# Patient Record
Sex: FEMALE | Race: WHITE | NOT HISPANIC OR LATINO | URBAN - METROPOLITAN AREA
[De-identification: names, ages, dates, MRNs, and addresses within clinical notes are randomized per-mention and may not be internally consistent; named-entity substitution may affect disease eponyms.]

---

## 2017-08-09 ENCOUNTER — APPOINTMENT (RX ONLY)
Dept: URBAN - METROPOLITAN AREA CLINIC 169 | Facility: CLINIC | Age: 52
Setting detail: DERMATOLOGY
End: 2017-08-09

## 2017-08-09 DIAGNOSIS — L98.8 OTHER SPECIFIED DISORDERS OF THE SKIN AND SUBCUTANEOUS TISSUE: ICD-10-CM

## 2017-08-09 PROBLEM — J44.9 CHRONIC OBSTRUCTIVE PULMONARY DISEASE, UNSPECIFIED: Status: ACTIVE | Noted: 2017-08-09

## 2017-08-09 PROBLEM — D23.61 OTHER BENIGN NEOPLASM OF SKIN OF RIGHT UPPER LIMB, INCLUDING SHOULDER: Status: ACTIVE | Noted: 2017-08-09

## 2017-08-09 PROBLEM — F32.9 MAJOR DEPRESSIVE DISORDER, SINGLE EPISODE, UNSPECIFIED: Status: ACTIVE | Noted: 2017-08-09

## 2017-08-09 PROBLEM — D48.5 NEOPLASM OF UNCERTAIN BEHAVIOR OF SKIN: Status: ACTIVE | Noted: 2017-08-09

## 2017-08-09 PROBLEM — E13.9 OTHER SPECIFIED DIABETES MELLITUS WITHOUT COMPLICATIONS: Status: ACTIVE | Noted: 2017-08-09

## 2017-08-09 PROBLEM — F41.9 ANXIETY DISORDER, UNSPECIFIED: Status: ACTIVE | Noted: 2017-08-09

## 2017-08-09 PROBLEM — I10 ESSENTIAL (PRIMARY) HYPERTENSION: Status: ACTIVE | Noted: 2017-08-09

## 2017-08-09 PROBLEM — C44.619 BASAL CELL CARCINOMA OF SKIN OF LEFT UPPER LIMB, INCLUDING SHOULDER: Status: ACTIVE | Noted: 2017-08-09

## 2017-08-09 PROCEDURE — ? COUNSELING

## 2017-08-09 PROCEDURE — ? BIOPSY BY SHAVE METHOD

## 2017-08-09 PROCEDURE — ? BIOPSY BY SHAVE METHOD AND DESTRUCTION

## 2017-08-09 PROCEDURE — 11100: CPT | Mod: 59

## 2017-08-09 PROCEDURE — 17261 DSTRJ MAL LES T/A/L .6-1.0CM: CPT

## 2017-08-09 PROCEDURE — 99202 OFFICE O/P NEW SF 15 MIN: CPT | Mod: 25

## 2017-08-09 ASSESSMENT — LOCATION SIMPLE DESCRIPTION DERM: LOCATION SIMPLE: LEFT UPPER ARM

## 2017-08-09 ASSESSMENT — LOCATION DETAILED DESCRIPTION DERM: LOCATION DETAILED: LEFT ANTERIOR PROXIMAL UPPER ARM

## 2017-08-09 ASSESSMENT — LOCATION ZONE DERM: LOCATION ZONE: ARM

## 2017-08-09 NOTE — PROCEDURE: BIOPSY BY SHAVE METHOD AND DESTRUCTION
Wound Care: Bacitracin
Consent: Verbal consent was obtained and risks were reviewed including but not limited to scarring, infection, bleeding, scabbing, incomplete removal, nerve damage and allergy to anesthesia.
Post-Care Instructions: Written post-care instruction given.   If the biopsy results does reveal that the lesion is a non-melanoma skin cancer, the lesion has been treated at the time of the biopsy with EDC.  This is usually adequate treatment for most NMSC but the site will need to be recheck in 3-6 months to ensure adequacy of treatment as there is a 7 to 10% chance of recurrence.  If the biopsy shows a more aggressive pathology then expected then additional treatment beyond EDC may be needed.
Biopsy Type: H and E
Number Of Curettages: 3
Billing Type: Third-Party Bill
Detail Level: Detailed
Bill For Surgical Tray: no
Render Post-Care Instructions In Note?: yes
Size Of Lesion In Cm (Optional): 0.7
Destruction Type: electrodesiccation
Lab: [id number]
Anesthesia Volume In Cc: 0.5
Hemostasis: Electrocautery
Anesthesia Type: 1% lidocaine without epinephrine
Bill As?: Malignant Destruction
Notification Instructions: Patient will be notified of biopsy results. However, patient instructed to call the office if not contacted within 2 weeks.

## 2017-08-09 NOTE — PROCEDURE: BIOPSY BY SHAVE METHOD
Additional Anesthesia Volume In Cc (Will Not Render If 0): 0
Type Of Destruction Used: Curettage
Size Of Lesion In Cm: 0.4
Anesthesia Type: 1% lidocaine with epinephrine
Silver Nitrate Text: The wound bed was treated with silver nitrate after the biopsy was performed.
Hemostasis: Aluminum Chloride and Electrocautery
Biopsy Method: 15 blade
Detail Level: Detailed
Post-Care Instructions: I reviewed with the patient in detail post-care instructions and written post-care instruction was given. Patient is to keep the biopsy site dry overnight, and then apply vaseline or Aquaphor daily until healed. Patient may wash biopsy site(s) with soap and water.
Bill 15524 For Specimen Handling/Conveyance To Laboratory?: no
Dressing: bandage
Biopsy Type: H and E
Wound Care: Vaseline
Electrodesiccation Text: The wound bed was treated with electrodesiccation after the biopsy was performed.
Lab: [id number]
Billing Type: Third-Party Bill
Cryotherapy Text: The wound bed was treated with cryotherapy after the biopsy was performed.
Lab Facility: [id number]
Consent: Verbal consent was obtained and risks were reviewed including but not limited to scarring, infection, bleeding, scabbing, incomplete removal, nerve damage and allergy to anesthesia.
Render Post-Care Instructions In Note?: yes
Anesthesia Volume In Cc (Will Not Render If 0): 0.5
Notification Instructions: Patient will be notified of biopsy results. However, patient instructed to call the office if not contacted within 2 weeks.
Electrodesiccation And Curettage Text: The wound bed was treated with electrodesiccation and curettage after the biopsy was performed.
Curettage Text: The wound bed was treated with curettage after the biopsy was performed.